# Patient Record
Sex: MALE | Race: WHITE | URBAN - METROPOLITAN AREA
[De-identification: names, ages, dates, MRNs, and addresses within clinical notes are randomized per-mention and may not be internally consistent; named-entity substitution may affect disease eponyms.]

---

## 2022-11-15 ENCOUNTER — OFFICE VISIT (OUTPATIENT)
Dept: URBAN - METROPOLITAN AREA CLINIC 23 | Facility: CLINIC | Age: 57
End: 2022-11-15
Payer: COMMERCIAL

## 2022-11-15 DIAGNOSIS — H04.123 DRY EYE SYNDROME OF BILATERAL LACRIMAL GLANDS: Primary | ICD-10-CM

## 2022-11-15 DIAGNOSIS — H52.4 PRESBYOPIA: ICD-10-CM

## 2022-11-15 PROCEDURE — 99203 OFFICE O/P NEW LOW 30 MIN: CPT | Performed by: OPTOMETRIST

## 2022-11-15 ASSESSMENT — INTRAOCULAR PRESSURE
OS: 16
OD: 16

## 2022-11-15 ASSESSMENT — KERATOMETRY
OS: 44.75
OD: 44.75

## 2022-11-15 ASSESSMENT — VISUAL ACUITY
OS: 20/20
OD: 20/20

## 2022-11-15 NOTE — IMPRESSION/PLAN
Impression: Dry eye syndrome of bilateral lacrimal glands: H04.123. Bilateral. Condition: will likely improve. Plan: Discussed findings. Recommend frequent use of artificial tears especially during computer usage. Gel at bedtime may also be helpful. Call with any worsening symptoms.

## 2022-11-15 NOTE — IMPRESSION/PLAN
Impression: Presbyopia: H52.4. Bilateral. Condition: mild. Plan: Discussed findings. New Rx given. Discussed adaptation.